# Patient Record
Sex: FEMALE | Race: WHITE | NOT HISPANIC OR LATINO | Employment: UNEMPLOYED | URBAN - METROPOLITAN AREA
[De-identification: names, ages, dates, MRNs, and addresses within clinical notes are randomized per-mention and may not be internally consistent; named-entity substitution may affect disease eponyms.]

---

## 2024-07-30 ENCOUNTER — APPOINTMENT (OUTPATIENT)
Dept: RADIOLOGY | Facility: HOSPITAL | Age: 59
End: 2024-07-30
Payer: COMMERCIAL

## 2024-07-30 ENCOUNTER — APPOINTMENT (OUTPATIENT)
Dept: CARDIOLOGY | Facility: HOSPITAL | Age: 59
End: 2024-07-30
Payer: COMMERCIAL

## 2024-07-30 ENCOUNTER — HOSPITAL ENCOUNTER (EMERGENCY)
Facility: HOSPITAL | Age: 59
Discharge: HOME | End: 2024-07-30
Attending: STUDENT IN AN ORGANIZED HEALTH CARE EDUCATION/TRAINING PROGRAM
Payer: COMMERCIAL

## 2024-07-30 VITALS
HEART RATE: 72 BPM | HEIGHT: 67 IN | TEMPERATURE: 97.9 F | WEIGHT: 138 LBS | RESPIRATION RATE: 12 BRPM | BODY MASS INDEX: 21.66 KG/M2 | SYSTOLIC BLOOD PRESSURE: 119 MMHG | DIASTOLIC BLOOD PRESSURE: 75 MMHG | OXYGEN SATURATION: 100 %

## 2024-07-30 DIAGNOSIS — R55 NEAR SYNCOPE: Primary | ICD-10-CM

## 2024-07-30 LAB
ALBUMIN SERPL BCP-MCNC: 4.1 G/DL (ref 3.4–5)
ALP SERPL-CCNC: 88 U/L (ref 33–110)
ALT SERPL W P-5'-P-CCNC: 14 U/L (ref 7–45)
ANION GAP SERPL CALC-SCNC: 16 MMOL/L (ref 10–20)
APTT PPP: 33 SECONDS (ref 27–38)
AST SERPL W P-5'-P-CCNC: 17 U/L (ref 9–39)
ATRIAL RATE: 79 BPM
BASOPHILS # BLD AUTO: 0.02 X10*3/UL (ref 0–0.1)
BASOPHILS NFR BLD AUTO: 0.2 %
BILIRUB SERPL-MCNC: 0.7 MG/DL (ref 0–1.2)
BUN SERPL-MCNC: 15 MG/DL (ref 6–23)
CALCIUM SERPL-MCNC: 9.5 MG/DL (ref 8.6–10.3)
CARDIAC TROPONIN I PNL SERPL HS: <3 NG/L (ref 0–13)
CHLORIDE SERPL-SCNC: 102 MMOL/L (ref 98–107)
CO2 SERPL-SCNC: 20 MMOL/L (ref 21–32)
CREAT SERPL-MCNC: 0.83 MG/DL (ref 0.5–1.05)
EGFRCR SERPLBLD CKD-EPI 2021: 82 ML/MIN/1.73M*2
EOSINOPHIL # BLD AUTO: 0.06 X10*3/UL (ref 0–0.7)
EOSINOPHIL NFR BLD AUTO: 0.6 %
ERYTHROCYTE [DISTWIDTH] IN BLOOD BY AUTOMATED COUNT: 11.9 % (ref 11.5–14.5)
FLUAV RNA RESP QL NAA+PROBE: NOT DETECTED
FLUBV RNA RESP QL NAA+PROBE: NOT DETECTED
GLUCOSE BLD MANUAL STRIP-MCNC: 152 MG/DL (ref 74–99)
GLUCOSE SERPL-MCNC: 154 MG/DL (ref 74–99)
HCT VFR BLD AUTO: 41.5 % (ref 36–46)
HGB BLD-MCNC: 14.9 G/DL (ref 12–16)
IMM GRANULOCYTES # BLD AUTO: 0.04 X10*3/UL (ref 0–0.7)
IMM GRANULOCYTES NFR BLD AUTO: 0.4 % (ref 0–0.9)
INR PPP: 1.3 (ref 0.9–1.1)
LIPASE SERPL-CCNC: 25 U/L (ref 9–82)
LYMPHOCYTES # BLD AUTO: 1.67 X10*3/UL (ref 1.2–4.8)
LYMPHOCYTES NFR BLD AUTO: 16.7 %
MCH RBC QN AUTO: 30.3 PG (ref 26–34)
MCHC RBC AUTO-ENTMCNC: 35.9 G/DL (ref 32–36)
MCV RBC AUTO: 84 FL (ref 80–100)
MONOCYTES # BLD AUTO: 0.71 X10*3/UL (ref 0.1–1)
MONOCYTES NFR BLD AUTO: 7.1 %
NEUTROPHILS # BLD AUTO: 7.48 X10*3/UL (ref 1.2–7.7)
NEUTROPHILS NFR BLD AUTO: 75 %
NRBC BLD-RTO: 0 /100 WBCS (ref 0–0)
P AXIS: 79 DEGREES
P OFFSET: 216 MS
P ONSET: 160 MS
PLATELET # BLD AUTO: 196 X10*3/UL (ref 150–450)
POTASSIUM SERPL-SCNC: 3.3 MMOL/L (ref 3.5–5.3)
PR INTERVAL: 122 MS
PROT SERPL-MCNC: 7.3 G/DL (ref 6.4–8.2)
PROTHROMBIN TIME: 14.2 SECONDS (ref 9.8–12.8)
Q ONSET: 221 MS
QRS COUNT: 13 BEATS
QRS DURATION: 92 MS
QT INTERVAL: 444 MS
QTC CALCULATION(BAZETT): 509 MS
QTC FREDERICIA: 486 MS
R AXIS: 83 DEGREES
RBC # BLD AUTO: 4.92 X10*6/UL (ref 4–5.2)
SARS-COV-2 RNA RESP QL NAA+PROBE: NOT DETECTED
SODIUM SERPL-SCNC: 135 MMOL/L (ref 136–145)
T AXIS: 48 DEGREES
T OFFSET: 443 MS
VENTRICULAR RATE: 79 BPM
WBC # BLD AUTO: 10 X10*3/UL (ref 4.4–11.3)

## 2024-07-30 PROCEDURE — 96375 TX/PRO/DX INJ NEW DRUG ADDON: CPT

## 2024-07-30 PROCEDURE — 99285 EMERGENCY DEPT VISIT HI MDM: CPT | Mod: 25

## 2024-07-30 PROCEDURE — 2550000001 HC RX 255 CONTRASTS: Performed by: STUDENT IN AN ORGANIZED HEALTH CARE EDUCATION/TRAINING PROGRAM

## 2024-07-30 PROCEDURE — 80053 COMPREHEN METABOLIC PANEL: CPT | Performed by: STUDENT IN AN ORGANIZED HEALTH CARE EDUCATION/TRAINING PROGRAM

## 2024-07-30 PROCEDURE — 70547 MR ANGIOGRAPHY NECK W/O DYE: CPT

## 2024-07-30 PROCEDURE — 84484 ASSAY OF TROPONIN QUANT: CPT | Performed by: STUDENT IN AN ORGANIZED HEALTH CARE EDUCATION/TRAINING PROGRAM

## 2024-07-30 PROCEDURE — 2500000004 HC RX 250 GENERAL PHARMACY W/ HCPCS (ALT 636 FOR OP/ED): Performed by: STUDENT IN AN ORGANIZED HEALTH CARE EDUCATION/TRAINING PROGRAM

## 2024-07-30 PROCEDURE — 36415 COLL VENOUS BLD VENIPUNCTURE: CPT | Performed by: STUDENT IN AN ORGANIZED HEALTH CARE EDUCATION/TRAINING PROGRAM

## 2024-07-30 PROCEDURE — 70544 MR ANGIOGRAPHY HEAD W/O DYE: CPT | Mod: 59

## 2024-07-30 PROCEDURE — 93005 ELECTROCARDIOGRAM TRACING: CPT

## 2024-07-30 PROCEDURE — 96361 HYDRATE IV INFUSION ADD-ON: CPT

## 2024-07-30 PROCEDURE — 70450 CT HEAD/BRAIN W/O DYE: CPT

## 2024-07-30 PROCEDURE — 2500000001 HC RX 250 WO HCPCS SELF ADMINISTERED DRUGS (ALT 637 FOR MEDICARE OP): Performed by: STUDENT IN AN ORGANIZED HEALTH CARE EDUCATION/TRAINING PROGRAM

## 2024-07-30 PROCEDURE — 83690 ASSAY OF LIPASE: CPT | Performed by: STUDENT IN AN ORGANIZED HEALTH CARE EDUCATION/TRAINING PROGRAM

## 2024-07-30 PROCEDURE — 70551 MRI BRAIN STEM W/O DYE: CPT | Performed by: RADIOLOGY

## 2024-07-30 PROCEDURE — 82947 ASSAY GLUCOSE BLOOD QUANT: CPT

## 2024-07-30 PROCEDURE — 87636 SARSCOV2 & INF A&B AMP PRB: CPT | Performed by: STUDENT IN AN ORGANIZED HEALTH CARE EDUCATION/TRAINING PROGRAM

## 2024-07-30 PROCEDURE — 85025 COMPLETE CBC W/AUTO DIFF WBC: CPT | Performed by: STUDENT IN AN ORGANIZED HEALTH CARE EDUCATION/TRAINING PROGRAM

## 2024-07-30 PROCEDURE — 70547 MR ANGIOGRAPHY NECK W/O DYE: CPT | Performed by: RADIOLOGY

## 2024-07-30 PROCEDURE — 70551 MRI BRAIN STEM W/O DYE: CPT

## 2024-07-30 PROCEDURE — 71275 CT ANGIOGRAPHY CHEST: CPT

## 2024-07-30 PROCEDURE — 96374 THER/PROPH/DIAG INJ IV PUSH: CPT

## 2024-07-30 PROCEDURE — 85610 PROTHROMBIN TIME: CPT | Performed by: STUDENT IN AN ORGANIZED HEALTH CARE EDUCATION/TRAINING PROGRAM

## 2024-07-30 PROCEDURE — 70450 CT HEAD/BRAIN W/O DYE: CPT | Performed by: RADIOLOGY

## 2024-07-30 PROCEDURE — 85730 THROMBOPLASTIN TIME PARTIAL: CPT | Performed by: STUDENT IN AN ORGANIZED HEALTH CARE EDUCATION/TRAINING PROGRAM

## 2024-07-30 RX ORDER — MECLIZINE HYDROCHLORIDE 25 MG/1
25 TABLET ORAL ONCE
Status: COMPLETED | OUTPATIENT
Start: 2024-07-30 | End: 2024-07-30

## 2024-07-30 RX ORDER — POTASSIUM CHLORIDE 1.5 G/1.58G
20 POWDER, FOR SOLUTION ORAL 2 TIMES DAILY
Status: DISCONTINUED | OUTPATIENT
Start: 2024-07-30 | End: 2024-07-30 | Stop reason: HOSPADM

## 2024-07-30 RX ORDER — ONDANSETRON HYDROCHLORIDE 2 MG/ML
4 INJECTION, SOLUTION INTRAVENOUS ONCE
Status: COMPLETED | OUTPATIENT
Start: 2024-07-30 | End: 2024-07-30

## 2024-07-30 RX ORDER — LORAZEPAM 2 MG/ML
1 INJECTION INTRAMUSCULAR ONCE
Status: COMPLETED | OUTPATIENT
Start: 2024-07-30 | End: 2024-07-30

## 2024-07-30 ASSESSMENT — PAIN SCALES - GENERAL
PAINLEVEL_OUTOF10: 0 - NO PAIN

## 2024-07-30 ASSESSMENT — LIFESTYLE VARIABLES
TOTAL SCORE: 0
EVER FELT BAD OR GUILTY ABOUT YOUR DRINKING: NO
EVER HAD A DRINK FIRST THING IN THE MORNING TO STEADY YOUR NERVES TO GET RID OF A HANGOVER: NO
HAVE PEOPLE ANNOYED YOU BY CRITICIZING YOUR DRINKING: NO
HAVE YOU EVER FELT YOU SHOULD CUT DOWN ON YOUR DRINKING: NO

## 2024-07-30 ASSESSMENT — COLUMBIA-SUICIDE SEVERITY RATING SCALE - C-SSRS
1. IN THE PAST MONTH, HAVE YOU WISHED YOU WERE DEAD OR WISHED YOU COULD GO TO SLEEP AND NOT WAKE UP?: NO
6. HAVE YOU EVER DONE ANYTHING, STARTED TO DO ANYTHING, OR PREPARED TO DO ANYTHING TO END YOUR LIFE?: NO
2. HAVE YOU ACTUALLY HAD ANY THOUGHTS OF KILLING YOURSELF?: NO

## 2024-07-30 ASSESSMENT — PAIN - FUNCTIONAL ASSESSMENT: PAIN_FUNCTIONAL_ASSESSMENT: 0-10

## 2024-07-30 NOTE — ED PROVIDER NOTES
CC: Dizziness (Pt presents to the ER with dizziness, extremity tingling, and weakness. PT states that this morning she began to feel acutely dizzy and went to lay down. Pts  states that she began getting increasingly worse from there and had trouble walking with him. PT states that she feels hot and cold, sweaty, that she's having lower left abdominal pain (5/10) and that the room is spinning. PT states that she feels like her chest is tight. Denies vomiting or diarrhea. PT does not take any home meds. )     HPI:  Patient is a 58-year-old female presents to the emergency department for dizziness.  She was in her normal state of health yesterday.  She has no significant past medical history and takes no daily meds.  She was doing laundry around 8 this morning when she felt lightheaded like she was going to pass out.  She laid on the couch and her  came to check on her and brought her to the emergency department for evaluation.  She states she has a headache and she is tingling in all of her extremities.  She also notes some mild left-sided chest pain and some stomach cramping.  She states her head feels cold and tingly.  She has a history of prior C-sections but no other past surgical history.  No history of DVT or PE.  Not on hormones.  No recent hospitalizations or prolonged immobility.  Patient denies alcohol, drug or tobacco use.  No known allergies.    Records Reviewed:  Recent available ED and inpatient notes reviewed in EMR.    PMHx/PSHx:  Per HPI.   - has no past medical history on file.  - has no past surgical history on file.  - does not have a problem list on file.    Medications:  Reviewed in EMR. See EMR for complete list of medications and doses.    Allergies:  Patient has no known allergies.    Social History:  - Tobacco:  has no history on file for tobacco use.   - Alcohol:  has no history on file for alcohol use.   - Illicit Drugs:  has no history on file for drug use.     ROS:  Per  HPI.       ???????????????????????????????????????????????????????????????  Triage Vitals:  T 36.7 °C (98.1 °F)  HR 83  /73  RR (!) 22  O2 100 % None (Room air)    Physical Exam  ???????????????????????????????????????????????????????????????  GEN: in acute distress  HEAD: atraumatic  EYES: PERRL, EOMI no nystagmus  ENT: mmm  CVS/CHEST: reg rate, nl rhythm  PULM: CTA b/l no wheezes, crackles, or rhonchi   GI: Mild right lower quadrant tenderness to palpation, no rebound or guarding  EXT: no LE edema, 2+ periph pulses in bilat radial and DP   NEURO: Awake and alert, eyes closed on exam but opens them to verbal stimuli.  NIH of 1 for sensory deficit on the right arm.  However patient has no drift, normal finger-nose-finger, normal repetition.  Answering questions appropriately.  Patient does have some truncal ataxia and feels like she is going to vomit when set her up in bed.  She will ambulate because of how dizzy she feels.  SKIN: Mild diaphoresis  PSYCH: Anxious     EKG:  EKG read by me reviewed by me is normal sinus rhythm at 79 bpm.  Normal axis.  Prolonged QTc at 509 ms.  Nonspecific ST segment changes with diffuse T wave flattening and T wave inversions appeared in anterior septal leads.    Assessment and Plan:  58-year-old female presents to the emergency department for dizziness but also endorsing chest pain and abdominal pain.  Given the neurologic symptoms in the setting of chest pain and abdominal pain concern for potential dissection therefore CTA obtained however she does have some truncal ataxia with an NIH of 1 due to sensory deficits on the right arm.  Given her low NIH I do not believe patient is a tenecteplase candidate but will obtain a CT of her head and a stroke alert called.  Disposition pending workup in the emergency department.  Treated symptomatically with a liter of IV fluids, 4 mg of Zofran and a dose of meclizine to see if it improves symptoms.  Discussed with patient and family  who is at bedside and agreeable with the plan.  Patient still very dizzy on reevaluation.  Will obtain MRI to rule out posterior circulation stroke.  If negative patient can likely be discharged.  Patient signed out to oncoming physician pending MRI and reevaluation after treatment.    ED Course:  ED Course as of 07/31/24 0658   Tue Jul 30, 2024   1041 Dr. Garcia from neuroradiology reviewed head CT and states CT Noncon negative. [HD]   1056 Troponin I, High Sensitivity: <3 [HD]   1124 No thoracoabdominal aorta dissection, aneurysm or intramural  hematoma.  2. No acute abnormality in the chest, abdomen or pelvis.  3. Mild right lower lobe bronchiolitis. Consider follow-up to  resolution in 3 months.  4. Probable sequelae of prior granulomatous infection with calcified right hilar and mediastinal lymphadenopathy and right lower lobe calcified granuloma.   [HD]      ED Course User Index  [HD] Ese Gutierrez DO         Diagnoses as of 07/31/24 0658   Near syncope       Social Determinants Limiting Care:  None identified    Disposition:  Signed out    Ese Gutierrez DO      Procedures ? [employer] last updated 7/30/2024 10:32 AM        Ese Gutierrez DO  07/31/24 0659

## 2024-07-30 NOTE — PROGRESS NOTES
Patient was signed out to me at change of shift by the previous ED team.  Please see previous provider's note for complete history and physical exam.    Briefly, this is a 58-year-old female, presenting to the emergency department for an episode of dizziness and near syncope which occurred just prior to arrival.  Workup prior to signout was overall grossly unremarkable apart from an elevation in her potassium, as well as a negative CT angio chest abdomen pelvis and negative CT brain attack.  At time of signout, the patient is pending MRI of the head and MRI of the head neck, and reassessment.  On reevaluation, the patient feels back to her baseline.  I reviewed the results with the patient, and we did discuss admission for presyncopal workup versus discharge home to follow-up with her primary care provider.  Patient prefers to be discharged home at this time, and understands that she can return at any time to the emergency department for any new or worsening symptoms.  She was discharged home in stable condition.    Impression: Near syncope  Disposition: Discharge home  Condition: Stable    Emani Christensen,   Emergency Medicine

## 2024-07-31 ENCOUNTER — TELEPHONE (OUTPATIENT)
Dept: PRIMARY CARE | Facility: CLINIC | Age: 59
End: 2024-07-31
Payer: COMMERCIAL

## 2024-07-31 NOTE — TELEPHONE ENCOUNTER
Patient was seen in the ER yesterday ( Jerman ) and was told to follow up with you.  When would you like me to put her in?

## 2024-08-02 ENCOUNTER — LAB (OUTPATIENT)
Dept: LAB | Facility: LAB | Age: 59
End: 2024-08-02
Payer: COMMERCIAL

## 2024-08-02 ENCOUNTER — OFFICE VISIT (OUTPATIENT)
Dept: PRIMARY CARE | Facility: CLINIC | Age: 59
End: 2024-08-02
Payer: COMMERCIAL

## 2024-08-02 VITALS
OXYGEN SATURATION: 99 % | BODY MASS INDEX: 22.4 KG/M2 | WEIGHT: 139.4 LBS | HEART RATE: 99 BPM | TEMPERATURE: 97.3 F | HEIGHT: 66 IN | DIASTOLIC BLOOD PRESSURE: 62 MMHG | SYSTOLIC BLOOD PRESSURE: 108 MMHG

## 2024-08-02 DIAGNOSIS — J21.9 BRONCHIOLITIS: ICD-10-CM

## 2024-08-02 DIAGNOSIS — E87.6 HYPOKALEMIA: ICD-10-CM

## 2024-08-02 DIAGNOSIS — R55 NEAR SYNCOPE: ICD-10-CM

## 2024-08-02 DIAGNOSIS — R93.89 ABNORMAL CT SCAN, CHEST: ICD-10-CM

## 2024-08-02 DIAGNOSIS — R55 NEAR SYNCOPE: Primary | ICD-10-CM

## 2024-08-02 LAB
ALBUMIN SERPL BCP-MCNC: 4.3 G/DL (ref 3.4–5)
ALP SERPL-CCNC: 87 U/L (ref 33–110)
ALT SERPL W P-5'-P-CCNC: 14 U/L (ref 7–45)
ANION GAP SERPL CALC-SCNC: 12 MMOL/L (ref 10–20)
AST SERPL W P-5'-P-CCNC: 17 U/L (ref 9–39)
BILIRUB SERPL-MCNC: 0.9 MG/DL (ref 0–1.2)
BUN SERPL-MCNC: 13 MG/DL (ref 6–23)
CALCIUM SERPL-MCNC: 9.5 MG/DL (ref 8.6–10.3)
CHLORIDE SERPL-SCNC: 102 MMOL/L (ref 98–107)
CO2 SERPL-SCNC: 28 MMOL/L (ref 21–32)
CREAT SERPL-MCNC: 0.84 MG/DL (ref 0.5–1.05)
EGFRCR SERPLBLD CKD-EPI 2021: 81 ML/MIN/1.73M*2
GLUCOSE SERPL-MCNC: 85 MG/DL (ref 74–99)
MAGNESIUM SERPL-MCNC: 2.11 MG/DL (ref 1.6–2.4)
POTASSIUM SERPL-SCNC: 4 MMOL/L (ref 3.5–5.3)
PROT SERPL-MCNC: 6.9 G/DL (ref 6.4–8.2)
SODIUM SERPL-SCNC: 138 MMOL/L (ref 136–145)

## 2024-08-02 PROCEDURE — 99214 OFFICE O/P EST MOD 30 MIN: CPT | Performed by: STUDENT IN AN ORGANIZED HEALTH CARE EDUCATION/TRAINING PROGRAM

## 2024-08-02 PROCEDURE — 1036F TOBACCO NON-USER: CPT | Performed by: STUDENT IN AN ORGANIZED HEALTH CARE EDUCATION/TRAINING PROGRAM

## 2024-08-02 PROCEDURE — 83735 ASSAY OF MAGNESIUM: CPT

## 2024-08-02 PROCEDURE — 3008F BODY MASS INDEX DOCD: CPT | Performed by: STUDENT IN AN ORGANIZED HEALTH CARE EDUCATION/TRAINING PROGRAM

## 2024-08-02 PROCEDURE — 80053 COMPREHEN METABOLIC PANEL: CPT

## 2024-08-02 PROCEDURE — 36415 COLL VENOUS BLD VENIPUNCTURE: CPT

## 2024-08-02 ASSESSMENT — PATIENT HEALTH QUESTIONNAIRE - PHQ9
SUM OF ALL RESPONSES TO PHQ9 QUESTIONS 1 AND 2: 0
1. LITTLE INTEREST OR PLEASURE IN DOING THINGS: NOT AT ALL
2. FEELING DOWN, DEPRESSED OR HOPELESS: NOT AT ALL

## 2024-08-02 ASSESSMENT — ENCOUNTER SYMPTOMS
LOSS OF SENSATION IN FEET: 0
OCCASIONAL FEELINGS OF UNSTEADINESS: 0
DEPRESSION: 0

## 2024-08-02 ASSESSMENT — PAIN SCALES - GENERAL: PAINLEVEL: 0-NO PAIN

## 2024-08-02 NOTE — PROGRESS NOTES
Subjective   Patient ID: Cuca Prasad is a 58 y.o. female who presents for ER Follow-up.    Past Medical, Surgical, and Family History reviewed and updated in chart.    Reviewed all medications by prescribing practitioner or clinical pharmacist (such as prescriptions, OTCs, herbal therapies and supplements) and documented in the medical record.    HPI  ED follow up   Cuca is a 58-year-old female with no significant PMH who presented to the ED with complaint of dizziness, chest pain, and abdominal pain.  Patient was feeling unsteady and having sensory deficits in her right arm.  Because of this the ED did order a CT brain attack, CT chest abdomen pelvis which were both negative.  Patient was still having dizziness on MRI brain and MRI of the head and neck were also ordered.  Both of which were also negative.  Lab work was 1 notable for elevated glucose and low potassium.  The patient was offered admission but ultimately discharged.    She is following up today in office. Has been feeling well since she left ED. She has been staying hydrated with water and liquid IV. On a typical day patient will have a glass of orange juice, and 1-2 glasses of water with lemon. She has many responsibilities taking care of mom, sister, and others. She had a couple sips of wine the night before symptom onset.     Sept 11 appt scheduled with cardiology for outpatient workup.   Denies any allergies.   Surg Hx- 2 C sections    Social: Never smoker, rarely drinks alcohol 1 glass of wine every 4-6 months. Had a small amount of wine the night before ED visit.     Review of Systems  All pertinent positive symptoms are included in the history of present illness.    All other systems have been reviewed and are negative and noncontributory to this patient's current ailments.    History reviewed. No pertinent past medical history.  History reviewed. No pertinent surgical history.  Social History     Tobacco Use    Smoking status: Never     "Smokeless tobacco: Never   Vaping Use    Vaping status: Never Used   Substance Use Topics    Alcohol use: Not Currently    Drug use: Never     No family history on file.    There is no immunization history on file for this patient.  No current outpatient medications  No Known Allergies    Objective   Vitals:    08/02/24 0836 08/02/24 0900   BP: 109/74 108/62   Pulse: 99    Temp: 36.3 °C (97.3 °F)    SpO2: 99%    Weight: 63.2 kg (139 lb 6.4 oz)    Height: 1.683 m (5' 6.25\")      Body mass index is 22.33 kg/m².    BP Readings from Last 3 Encounters:   08/02/24 108/62   07/30/24 119/75   11/03/21 100/58      Wt Readings from Last 3 Encounters:   08/02/24 63.2 kg (139 lb 6.4 oz)   07/30/24 62.6 kg (138 lb)   11/03/21 63 kg (139 lb)        Admission on 07/30/2024, Discharged on 07/30/2024   Component Date Value    POCT Glucose 07/30/2024 152 (H)     WBC 07/30/2024 10.0     nRBC 07/30/2024 0.0     RBC 07/30/2024 4.92     Hemoglobin 07/30/2024 14.9     Hematocrit 07/30/2024 41.5     MCV 07/30/2024 84     MCH 07/30/2024 30.3     MCHC 07/30/2024 35.9     RDW 07/30/2024 11.9     Platelets 07/30/2024 196     Neutrophils % 07/30/2024 75.0     Immature Granulocytes %,* 07/30/2024 0.4     Lymphocytes % 07/30/2024 16.7     Monocytes % 07/30/2024 7.1     Eosinophils % 07/30/2024 0.6     Basophils % 07/30/2024 0.2     Neutrophils Absolute 07/30/2024 7.48     Immature Granulocytes Ab* 07/30/2024 0.04     Lymphocytes Absolute 07/30/2024 1.67     Monocytes Absolute 07/30/2024 0.71     Eosinophils Absolute 07/30/2024 0.06     Basophils Absolute 07/30/2024 0.02     Glucose 07/30/2024 154 (H)     Sodium 07/30/2024 135 (L)     Potassium 07/30/2024 3.3 (L)     Chloride 07/30/2024 102     Bicarbonate 07/30/2024 20 (L)     Anion Gap 07/30/2024 16     Urea Nitrogen 07/30/2024 15     Creatinine 07/30/2024 0.83     eGFR 07/30/2024 82     Calcium 07/30/2024 9.5     Albumin 07/30/2024 4.1     Alkaline Phosphatase 07/30/2024 88     Total Protein " 07/30/2024 7.3     AST 07/30/2024 17     Bilirubin, Total 07/30/2024 0.7     ALT 07/30/2024 14     Troponin I, High Sensiti* 07/30/2024 <3     Protime 07/30/2024 14.2 (H)     INR 07/30/2024 1.3 (H)     aPTT 07/30/2024 33     Ventricular Rate 07/30/2024 79     Atrial Rate 07/30/2024 79     ND Interval 07/30/2024 122     QRS Duration 07/30/2024 92     QT Interval 07/30/2024 444     QTC Calculation(Bazett) 07/30/2024 509     P Axis 07/30/2024 79     R Axis 07/30/2024 83     T Axis 07/30/2024 48     QRS Count 07/30/2024 13     Q Onset 07/30/2024 221     P Onset 07/30/2024 160     P Offset 07/30/2024 216     T Offset 07/30/2024 443     QTC Fredericia 07/30/2024 486     Lipase 07/30/2024 25     Flu A Result 07/30/2024 Not Detected     Flu B Result 07/30/2024 Not Detected     Coronavirus 2019, PCR 07/30/2024 Not Detected      Physical Exam  CONSTITUTIONAL - well nourished, well developed, looks like stated age, in no acute distress, not ill-appearing, and not tired appearing  SKIN - normal skin color and pigmentation, normal skin turgor without rash, lesions, or nodules visualized  HEAD - no trauma, normocephalic  EYES - extraocular muscles are intact, and normal external exam  NECK - supple without rigidity, no neck mass was observed  CHEST - clear to auscultation, no wheezing, no crackles and no rales, good effort  CARDIAC - regular rate and regular rhythm, no skipped beats, no murmur  ABDOMEN - normal bowel sounds  EXTREMITIES - no obvious or evident edema, no obvious or evident deformities  NEUROLOGICAL - normal gait, normal balance, normal motor, no ataxia; alert, oriented and no focal signs  PSYCHIATRIC - alert, pleasant and cordial, age-appropriate  IMMUNOLOGIC - no cervical lymphadenopathy    Assessment/Plan   No red flags. Follow up for annual exam.     Problem List Items Addressed This Visit       Near syncope - Primary     Constellation of symptoms leading up to the ED concerning for symptomatic hypotension v  cardiac etiology v CVA.  Stroke and aortic dissection were ruled out.  Patient has an appointment with cardiology scheduled on September 11.  Advised patient to follow-up with cardiology for further workup.         Relevant Orders    Comprehensive metabolic panel    Magnesium    Hypokalemia     Patient was mildly hypokalemic in the ED at 3.3.  We will recheck a CMP to ensure that it has resolved.  Will follow-up with results.         Relevant Orders    Comprehensive metabolic panel    Magnesium    RESOLVED: Bronchiolitis    Relevant Orders    CT chest w and wo IV contrast    Abnormal CT scan, chest     CT chest shows RLL bronchiolitis.  Radiology recommends 3-month follow-up CT chest to ensure resolution.  Order placed for CT chest for the end of October.  Will follow-up after results.         Relevant Orders    CT chest w and wo IV contrast

## 2024-08-02 NOTE — ASSESSMENT & PLAN NOTE
Patient was mildly hypokalemic in the ED at 3.3.  We will recheck a CMP to ensure that it has resolved.  Will follow-up with results.

## 2024-08-02 NOTE — ASSESSMENT & PLAN NOTE
Constellation of symptoms leading up to the ED concerning for symptomatic hypotension v cardiac etiology v CVA.  Stroke and aortic dissection were ruled out.  Patient has an appointment with cardiology scheduled on September 11.  Advised patient to follow-up with cardiology for further workup.

## 2024-08-02 NOTE — ASSESSMENT & PLAN NOTE
CT chest shows RLL bronchiolitis.  Radiology recommends 3-month follow-up CT chest to ensure resolution.  Order placed for CT chest for the end of October.  Will follow-up after results.

## 2024-09-11 ENCOUNTER — OFFICE VISIT (OUTPATIENT)
Dept: CARDIOLOGY | Facility: HOSPITAL | Age: 59
End: 2024-09-11
Payer: COMMERCIAL

## 2024-09-11 VITALS
BODY MASS INDEX: 22.35 KG/M2 | HEART RATE: 71 BPM | DIASTOLIC BLOOD PRESSURE: 85 MMHG | OXYGEN SATURATION: 99 % | WEIGHT: 139.55 LBS | SYSTOLIC BLOOD PRESSURE: 130 MMHG

## 2024-09-11 DIAGNOSIS — R55 NEAR SYNCOPE: Primary | ICD-10-CM

## 2024-09-11 LAB
ATRIAL RATE: 67 BPM
P AXIS: 72 DEGREES
P OFFSET: 213 MS
P ONSET: 164 MS
PR INTERVAL: 118 MS
Q ONSET: 223 MS
QRS COUNT: 11 BEATS
QRS DURATION: 90 MS
QT INTERVAL: 408 MS
QTC CALCULATION(BAZETT): 431 MS
QTC FREDERICIA: 423 MS
R AXIS: 82 DEGREES
T AXIS: 69 DEGREES
T OFFSET: 427 MS
VENTRICULAR RATE: 67 BPM

## 2024-09-11 PROCEDURE — 1036F TOBACCO NON-USER: CPT | Performed by: INTERNAL MEDICINE

## 2024-09-11 PROCEDURE — 99214 OFFICE O/P EST MOD 30 MIN: CPT | Performed by: INTERNAL MEDICINE

## 2024-09-11 PROCEDURE — 93005 ELECTROCARDIOGRAM TRACING: CPT | Performed by: INTERNAL MEDICINE

## 2024-09-11 PROCEDURE — 99204 OFFICE O/P NEW MOD 45 MIN: CPT | Performed by: INTERNAL MEDICINE

## 2024-09-11 NOTE — PROGRESS NOTES
Referred by Dr. Christensen for near syncope       History Of Present Illness:    Cuca Prasad is a 59 y.o. female with no significant past medical history presenting today to the [facility] for follow up s/p recent ER evaluation for vertigo.  Reports that she will feel lightheaded intermittently with change in positions or prolonged standing.  On 7/30/2024 she was evaluated in the ER for vertigo.  Her family was visiting from out of town.  She was quite busy the day/night prior-- admits she does not typically drink a lot of water.  She had a glass of wine with dinner which is atypical for her as well.  The next morning she felt lightheaded upon awakening.  Went to do some laundry and lightheadedness became worse.  Laid on the couch and her  came and checked on her.  When she sat up to drink some water she reports sudden severe vertigo with associated nausea, hot flash then cold sweat.  Symptoms had lessened in intensity prior to arrival to the ER. She was given IVF, zofran, K+ replacement and meclizine.  Stroke work up was negative.  Symptoms resolved prior to discharge.  Has not had recurrent vertigo since this event.  Denies palpitations.       Past Medical History:  She has no past medical history on file.    Past Surgical History:  She has no past surgical history on file.      Social History:  She reports that she has never smoked. She has never used smokeless tobacco. She reports that she does not currently use alcohol. She reports that she does not use drugs.    Family History:  Father and Sister-- vertigo      Allergies:  Patient has no known allergies.    Outpatient Medications:  No current outpatient medications     Last Recorded Vitals:  Vitals:    09/11/24 1003   BP: 130/85   Pulse: 71   SpO2: 99%   Weight: 63.3 kg (139 lb 8.8 oz)       Physical Exam:  Constitutional: Pleasant, Awake/Alert/Oriented to person place and time. No distress  Head: Atraumatic,  "Normocephalic  Eyes: EOMI. JOANNA  Neck:  No JVD  Cardiovascular: Regular rate and rhythm, S1, S2. No extra heart sounds or murmurs  Respiratory: Clear to auscultation bilaterally. No wheezing, rales or rhonchi. Good chest wall expansion  Abdomen: Soft, Nontender. Bowel sounds appreciated  Musculoskeletal: ROM intact. Muscle strength grossly intact upper and lower extremities 5/5.   Neurological: CNII-XII intact. Sensation grossly intact  Extremities: Warm and dry. No acute rashes and lesions  Psychiatric: Appropriate mood and affect         Last Labs:  CBC -  Lab Results   Component Value Date    WBC 10.0 07/30/2024    HGB 14.9 07/30/2024    HCT 41.5 07/30/2024    MCV 84 07/30/2024     07/30/2024       CMP -  Lab Results   Component Value Date    CALCIUM 9.5 08/02/2024    PROT 6.9 08/02/2024    ALBUMIN 4.3 08/02/2024    AST 17 08/02/2024    ALT 14 08/02/2024    ALKPHOS 87 08/02/2024    BILITOT 0.9 08/02/2024       LIPID PANEL -   Lab Results   Component Value Date    CHOL 167 12/06/2022    TRIG 46 12/06/2022    HDL 67 12/06/2022    CHHDL 2.5 12/06/2022       RENAL FUNCTION PANEL -   Lab Results   Component Value Date    GLUCOSE 85 08/02/2024     08/02/2024    K 4.0 08/02/2024     08/02/2024    CO2 28 08/02/2024    ANIONGAP 12 08/02/2024    BUN 13 08/02/2024    CREATININE 0.84 08/02/2024    CALCIUM 9.5 08/02/2024    ALBUMIN 4.3 08/02/2024        No results found for: \"BNP\", \"HGBA1C\"    Last Cardiology Tests:  ECG:  NSR, HR 67bpm       Lab review: I have personally reviewed the laboratory result(s)   Diagnostic review: I have personally reviewed the result(s) of the EKG .     Assessment/Plan   Very pleasant  59 y.o. female with no significant past medical history presenting today to the [facility] for follow up s/p recent ER evaluation for isolated event of vertigo. Also reports feeling lightheaded with change in positions/prolonged standing/working in the heat.  "     Plan:  -Recommend obtaining an echocardiogram for assessment of mechanical and structural function.   -Recommend staying well hydrated with 64oz water daily or more, eat high Na+ diet, wear compression stockings, change positions slowly  -Consider follow up with Neurology if echocardiogram is uneventful and if symptoms return/worsen.     Thank you for allowing us to participate in Cuca's care, please contact me anytime with questions or concerns.      Payal Nichols, APRN-CNP  Benigno Oh MD

## 2024-10-02 ASSESSMENT — PROMIS GLOBAL HEALTH SCALE
CARRYOUT_SOCIAL_ACTIVITIES: VERY GOOD
RATE_MENTAL_HEALTH: VERY GOOD
RATE_SOCIAL_SATISFACTION: VERY GOOD
RATE_QUALITY_OF_LIFE: VERY GOOD
RATE_AVERAGE_PAIN: 0
RATE_PHYSICAL_HEALTH: VERY GOOD
RATE_GENERAL_HEALTH: VERY GOOD
EMOTIONAL_PROBLEMS: RARELY
CARRYOUT_PHYSICAL_ACTIVITIES: COMPLETELY

## 2024-10-03 ENCOUNTER — HOSPITAL ENCOUNTER (OUTPATIENT)
Dept: CARDIOLOGY | Facility: HOSPITAL | Age: 59
Discharge: HOME | End: 2024-10-03
Payer: COMMERCIAL

## 2024-10-03 DIAGNOSIS — R55 NEAR SYNCOPE: ICD-10-CM

## 2024-10-03 LAB
AORTIC VALVE MEAN GRADIENT: 3 MMHG
AORTIC VALVE PEAK VELOCITY: 1.22 M/S
AV PEAK GRADIENT: 6 MMHG
AVA (PEAK VEL): 1.94 CM2
AVA (VTI): 2.12 CM2
EJECTION FRACTION APICAL 4 CHAMBER: 50.7
EJECTION FRACTION: 58 %
LEFT ATRIUM VOLUME AREA LENGTH INDEX BSA: 17.1 ML/M2
LEFT VENTRICLE INTERNAL DIMENSION DIASTOLE: 3.99 CM (ref 3.5–6)
LEFT VENTRICULAR OUTFLOW TRACT DIAMETER: 1.8 CM
MITRAL VALVE E/A RATIO: 1.31
RIGHT VENTRICLE FREE WALL PEAK S': 10.1 CM/S
RIGHT VENTRICLE PEAK SYSTOLIC PRESSURE: 26 MMHG
TRICUSPID ANNULAR PLANE SYSTOLIC EXCURSION: 2 CM

## 2024-10-03 PROCEDURE — 93306 TTE W/DOPPLER COMPLETE: CPT

## 2024-10-03 PROCEDURE — 93306 TTE W/DOPPLER COMPLETE: CPT | Performed by: INTERNAL MEDICINE

## 2024-10-09 ENCOUNTER — APPOINTMENT (OUTPATIENT)
Dept: PRIMARY CARE | Facility: CLINIC | Age: 59
End: 2024-10-09

## 2024-10-09 VITALS
SYSTOLIC BLOOD PRESSURE: 124 MMHG | HEART RATE: 64 BPM | TEMPERATURE: 98.7 F | WEIGHT: 153 LBS | OXYGEN SATURATION: 99 % | HEIGHT: 66 IN | BODY MASS INDEX: 24.59 KG/M2 | DIASTOLIC BLOOD PRESSURE: 72 MMHG

## 2024-10-09 DIAGNOSIS — Z12.31 ENCOUNTER FOR SCREENING MAMMOGRAM FOR MALIGNANT NEOPLASM OF BREAST: ICD-10-CM

## 2024-10-09 DIAGNOSIS — Z12.11 ENCOUNTER FOR SCREENING FOR MALIGNANT NEOPLASM OF COLON: ICD-10-CM

## 2024-10-09 DIAGNOSIS — Z00.00 ENCOUNTER FOR PREVENTATIVE ADULT HEALTH CARE EXAMINATION: Primary | ICD-10-CM

## 2024-10-09 DIAGNOSIS — Z00.00 ENCOUNTER FOR PREVENTIVE HEALTH EXAMINATION: ICD-10-CM

## 2024-10-09 PROBLEM — E87.6 HYPOKALEMIA: Status: RESOLVED | Noted: 2024-08-02 | Resolved: 2024-10-09

## 2024-10-09 PROBLEM — R55 NEAR SYNCOPE: Status: RESOLVED | Noted: 2024-08-02 | Resolved: 2024-10-09

## 2024-10-09 PROCEDURE — 99396 PREV VISIT EST AGE 40-64: CPT | Performed by: FAMILY MEDICINE

## 2024-10-09 PROCEDURE — 1036F TOBACCO NON-USER: CPT | Performed by: FAMILY MEDICINE

## 2024-10-09 PROCEDURE — 3008F BODY MASS INDEX DOCD: CPT | Performed by: FAMILY MEDICINE

## 2024-10-09 RX ORDER — ZINC GLUCONATE 50 MG
TABLET ORAL DAILY
COMMUNITY

## 2024-10-09 RX ORDER — BISMUTH SUBSALICYLATE 262 MG
1 TABLET,CHEWABLE ORAL DAILY
COMMUNITY

## 2024-10-09 NOTE — PROGRESS NOTES
I reviewed and examined the patient. I was present for the key exam elements, and I fully participated in the patient's care. I discussed the management of the care with the resident. I have personally reviewed the pertinent labs and imaging, as well as recent notes, with the patient. I have reviewed the note above and agree with the resident's medical decision making as documented in the resident's note, in addition to the following comments / findings:     Agree with the rest of the plan outlined below by resident physician. No red flags.      The patient understands and agrees to the assessment and plan of care. Patient has also agreed to follow up and comply with the treatment and evaluation as recommended today. Patient was instructed to call the office at [phone] should questions arise regarding their treatment or care.     Taran Garvin DO, FAOASM  Family Medicine   [address], [address]  [place] [address]     Taran Garvin DO

## 2024-10-09 NOTE — PROGRESS NOTES
Subjective   Patient ID: Cuca Prasad is a 59 y.o. female who presents for Annual Exam.    HPI  Diet is well balanced. Very healthy   Exercises regularly.  Due for colon cancer screening. last done 8/23/2011 w/ 5 year recall  Mammogram due last done 10/09/2018  PAP is up to date. 3/14/24  Vaccines are not up to date; they are due for: influenza (declined)   Dental exam is up to date.  Vision exam is up to date.    Social: Tobacco use-  no        Alcohol use- very rarely   Caffeine - none     Other concerns addressed today include: none.     No syncope or lightheadedness since ER visit. Says her cardiologist said it was likely due to dehydration.     Had extensive conversation about getting repeat CT chest done. Gave full informed consent including all information about risks and benefits. Patient has autonomy of choice to repeat the CT or decline. Understands benefit of repeat imaging, possible risks of radiation, goals of treatment, trajectory of not repeating imaging or alternative options.  Patient decided to discuss risks and benefits with her  and will decide if she should get the CT done or not (scheduled at the end of the month).     Review of Systems  All pertinent positive symptoms are included in the history of present illness.    All other systems have been reviewed and are negative and noncontributory to this patient's current ailments.     Social History     Tobacco Use    Smoking status: Never    Smokeless tobacco: Never   Substance Use Topics    Alcohol use: Not Currently      No past surgical history on file.   No Known Allergies     Current Outpatient Medications   Medication Sig Dispense Refill    magnesium, amino acid chelate, 133 mg tablet Take 1 tablet (133 mg) by mouth 2 times a day.      multivitamin tablet Take 1 tablet by mouth once daily.      zinc gluconate 50 mg tablet Take by mouth once daily.       No current facility-administered medications for this visit.        Patient  "Active Problem List   Diagnosis    Abnormal CT scan, chest    Encounter for preventative adult health care examination    Encounter for screening mammogram for malignant neoplasm of breast    Encounter for screening for malignant neoplasm of colon        There is no immunization history on file for this patient.    Objective   VITALS  /72   Pulse 64   Temp 37.1 °C (98.7 °F)   Ht 1.683 m (5' 6.25\")   Wt 69.4 kg (153 lb)   SpO2 99%   BMI 24.51 kg/m²      Physical Exam  CONSTITUTIONAL - well nourished, well developed, looks like stated age, in no acute distress, not ill-appearing, and not tired appearing  SKIN - normal skin color and pigmentation, normal skin turgor without rash, lesions, or nodules visualized  HEAD - no trauma, normocephalic  EYES - pupils are equal and reactive to light, extraocular muscles are intact, and normal external exam  ENT - TM's intact, no injection, no signs of infection, uvula midline, normal tongue movement and throat normal, no exudate, nasal passage without discharge and patent  NECK - supple without rigidity, no neck mass was observed, no thyromegaly or thyroid nodules  RESPIRATORY - clear to auscultation, no wheezing, no crackles and no rales, good effort  CARDIAC - regular rate and regular rhythm, no skipped beats, no murmur  ABDOMEN - no organomegaly, soft, nontender, nondistended, normal bowel sounds, no guarding/rebound/rigidity, negative McBurney sign and negative Marshall sign  EXTREMITIES - no edema, no deformities  NEUROLOGICAL - normal gait, normal balance, normal motor, no ataxia, DTRs equal and symmetrical; alert, oriented and no focal signs  PSYCHIATRIC - alert, pleasant and cordial, age-appropriate  IMMUNOLOGIC - no cervical lymphadenopathy       Assessment/Plan   Problem List Items Addressed This Visit             ICD-10-CM    Encounter for preventative adult health care examination - Primary Z00.00    Relevant Orders    BI mammo bilateral screening " tomosynthesis    Colonoscopy Screening; Average Risk Patient    CBC    Comprehensive Metabolic Panel    Hemoglobin A1C    Lipid Panel    TSH with reflex to Free T4 if abnormal    Vitamin D 25-Hydroxy,Total (for eval of Vitamin D levels)    Encounter for screening mammogram for malignant neoplasm of breast Z12.31    Relevant Orders    BI mammo bilateral screening tomosynthesis    Encounter for screening for malignant neoplasm of colon Z12.11    Relevant Orders    Colonoscopy Screening; Average Risk Patient     Other Visit Diagnoses         Codes    Encounter for preventive health examination     Z00.00    Relevant Orders    CBC    Comprehensive Metabolic Panel    Hemoglobin A1C    Lipid Panel    TSH with reflex to Free T4 if abnormal    Vitamin D 25-Hydroxy,Total (for eval of Vitamin D levels)          Healthy appearing 60 y/o F presents for AWV.   -Annual labs ordered   -Screenings ordered   -Declines influenza vaccine today    Return as needed or in one year for AWV.       I have personally reviewed all available pertinent labs, imaging, and consult notes with the patient.      All questions and concerns were addressed. Patient verbalizes understanding instructions and agrees with established plan of care.      Patient seen and discussed with Dr. Bharathi Miranda DO, MA   PGY-1 [facility]

## 2024-10-31 ENCOUNTER — LAB (OUTPATIENT)
Dept: LAB | Facility: LAB | Age: 59
End: 2024-10-31
Payer: COMMERCIAL

## 2024-10-31 ENCOUNTER — HOSPITAL ENCOUNTER (OUTPATIENT)
Dept: RADIOLOGY | Facility: HOSPITAL | Age: 59
Discharge: HOME | End: 2024-10-31
Payer: COMMERCIAL

## 2024-10-31 DIAGNOSIS — Z00.00 ENCOUNTER FOR PREVENTIVE HEALTH EXAMINATION: ICD-10-CM

## 2024-10-31 DIAGNOSIS — Z00.00 ENCOUNTER FOR PREVENTATIVE ADULT HEALTH CARE EXAMINATION: ICD-10-CM

## 2024-10-31 DIAGNOSIS — J21.9 BRONCHIOLITIS: ICD-10-CM

## 2024-10-31 DIAGNOSIS — R93.89 ABNORMAL CT SCAN, CHEST: ICD-10-CM

## 2024-10-31 LAB
25(OH)D3 SERPL-MCNC: 34 NG/ML (ref 30–100)
ALBUMIN SERPL BCP-MCNC: 4.1 G/DL (ref 3.4–5)
ALP SERPL-CCNC: 91 U/L (ref 33–110)
ALT SERPL W P-5'-P-CCNC: 12 U/L (ref 7–45)
ANION GAP SERPL CALC-SCNC: 13 MMOL/L (ref 10–20)
AST SERPL W P-5'-P-CCNC: 17 U/L (ref 9–39)
BILIRUB SERPL-MCNC: 0.8 MG/DL (ref 0–1.2)
BUN SERPL-MCNC: 17 MG/DL (ref 6–23)
CALCIUM SERPL-MCNC: 9.2 MG/DL (ref 8.6–10.3)
CHLORIDE SERPL-SCNC: 103 MMOL/L (ref 98–107)
CHOLEST SERPL-MCNC: 145 MG/DL (ref 0–199)
CHOLESTEROL/HDL RATIO: 2.3
CO2 SERPL-SCNC: 28 MMOL/L (ref 21–32)
CREAT SERPL-MCNC: 0.78 MG/DL (ref 0.5–1.05)
EGFRCR SERPLBLD CKD-EPI 2021: 88 ML/MIN/1.73M*2
ERYTHROCYTE [DISTWIDTH] IN BLOOD BY AUTOMATED COUNT: 12 % (ref 11.5–14.5)
EST. AVERAGE GLUCOSE BLD GHB EST-MCNC: 100 MG/DL
GLUCOSE SERPL-MCNC: 113 MG/DL (ref 74–99)
HBA1C MFR BLD: 5.1 %
HCT VFR BLD AUTO: 42.7 % (ref 36–46)
HDLC SERPL-MCNC: 64.2 MG/DL
HGB BLD-MCNC: 14.5 G/DL (ref 12–16)
LDLC SERPL CALC-MCNC: 68 MG/DL
MCH RBC QN AUTO: 30 PG (ref 26–34)
MCHC RBC AUTO-ENTMCNC: 34 G/DL (ref 32–36)
MCV RBC AUTO: 88 FL (ref 80–100)
NON HDL CHOLESTEROL: 81 MG/DL (ref 0–149)
NRBC BLD-RTO: 0 /100 WBCS (ref 0–0)
PLATELET # BLD AUTO: 171 X10*3/UL (ref 150–450)
POTASSIUM SERPL-SCNC: 3.9 MMOL/L (ref 3.5–5.3)
PROT SERPL-MCNC: 6.7 G/DL (ref 6.4–8.2)
RBC # BLD AUTO: 4.83 X10*6/UL (ref 4–5.2)
SODIUM SERPL-SCNC: 140 MMOL/L (ref 136–145)
TRIGL SERPL-MCNC: 63 MG/DL (ref 0–149)
TSH SERPL-ACNC: 2.23 MIU/L (ref 0.44–3.98)
VLDL: 13 MG/DL (ref 0–40)
WBC # BLD AUTO: 6.3 X10*3/UL (ref 4.4–11.3)

## 2024-10-31 PROCEDURE — 71260 CT THORAX DX C+: CPT

## 2024-10-31 PROCEDURE — 80061 LIPID PANEL: CPT

## 2024-10-31 PROCEDURE — 83036 HEMOGLOBIN GLYCOSYLATED A1C: CPT

## 2024-10-31 PROCEDURE — 36415 COLL VENOUS BLD VENIPUNCTURE: CPT

## 2024-10-31 PROCEDURE — 2550000001 HC RX 255 CONTRASTS

## 2024-10-31 PROCEDURE — 82306 VITAMIN D 25 HYDROXY: CPT

## 2024-10-31 PROCEDURE — 84443 ASSAY THYROID STIM HORMONE: CPT

## 2024-10-31 PROCEDURE — 80053 COMPREHEN METABOLIC PANEL: CPT

## 2024-10-31 PROCEDURE — 85027 COMPLETE CBC AUTOMATED: CPT

## 2024-11-05 ENCOUNTER — HOSPITAL ENCOUNTER (OUTPATIENT)
Dept: RADIOLOGY | Facility: HOSPITAL | Age: 59
Discharge: HOME | End: 2024-11-05
Payer: COMMERCIAL

## 2024-11-05 VITALS — BODY MASS INDEX: 22.5 KG/M2 | HEIGHT: 66 IN | WEIGHT: 140 LBS

## 2024-11-05 DIAGNOSIS — Z12.31 ENCOUNTER FOR SCREENING MAMMOGRAM FOR MALIGNANT NEOPLASM OF BREAST: ICD-10-CM

## 2024-11-05 DIAGNOSIS — Z00.00 ENCOUNTER FOR PREVENTATIVE ADULT HEALTH CARE EXAMINATION: ICD-10-CM

## 2024-11-05 PROCEDURE — 77063 BREAST TOMOSYNTHESIS BI: CPT | Performed by: STUDENT IN AN ORGANIZED HEALTH CARE EDUCATION/TRAINING PROGRAM

## 2024-11-05 PROCEDURE — 77067 SCR MAMMO BI INCL CAD: CPT

## 2024-11-05 PROCEDURE — 77067 SCR MAMMO BI INCL CAD: CPT | Performed by: STUDENT IN AN ORGANIZED HEALTH CARE EDUCATION/TRAINING PROGRAM

## 2024-11-08 DIAGNOSIS — R93.89 ABNORMAL CT SCAN, CHEST: Primary | ICD-10-CM
